# Patient Record
Sex: FEMALE | Race: WHITE | NOT HISPANIC OR LATINO | URBAN - METROPOLITAN AREA
[De-identification: names, ages, dates, MRNs, and addresses within clinical notes are randomized per-mention and may not be internally consistent; named-entity substitution may affect disease eponyms.]

---

## 2020-06-19 ENCOUNTER — TELEPHONE ENCOUNTER (OUTPATIENT)
Dept: URBAN - METROPOLITAN AREA CLINIC 54 | Facility: CLINIC | Age: 50
End: 2020-06-19

## 2020-06-19 RX ORDER — BUDESONIDE 3 MG/1
TAKE 3 CAPSULES (9 MG) BY ORAL ROUTE ONCE DAILY IN THE MORNING FOR UP TO 8 WEEKS CAPSULE ORAL 1
Qty: 90 | Refills: 1
Start: 2020-03-27

## 2020-07-08 ENCOUNTER — TELEPHONE ENCOUNTER (OUTPATIENT)
Dept: URBAN - METROPOLITAN AREA CLINIC 54 | Facility: CLINIC | Age: 50
End: 2020-07-08

## 2020-07-08 RX ORDER — BUDESONIDE 3 MG/1
TAKE 3 CAPSULES (9 MG) BY ORAL ROUTE ONCE DAILY IN THE MORNING FOR UP TO 8 WEEKS CAPSULE ORAL 1
Qty: 90 | Refills: 1
Start: 2020-03-27

## 2020-07-17 ENCOUNTER — TELEPHONE ENCOUNTER (OUTPATIENT)
Dept: URBAN - METROPOLITAN AREA CLINIC 54 | Facility: CLINIC | Age: 50
End: 2020-07-17

## 2020-07-21 ENCOUNTER — TELEPHONE ENCOUNTER (OUTPATIENT)
Dept: URBAN - METROPOLITAN AREA CLINIC 54 | Facility: CLINIC | Age: 50
End: 2020-07-21

## 2020-07-28 ENCOUNTER — TELEPHONE ENCOUNTER (OUTPATIENT)
Dept: URBAN - METROPOLITAN AREA CLINIC 54 | Facility: CLINIC | Age: 50
End: 2020-07-28

## 2020-07-28 RX ORDER — ADALIMUMAB 40MG/0.4ML
AS DIRECTED KIT SUBCUTANEOUS
Qty: 6 | Refills: 5 | OUTPATIENT

## 2020-07-28 RX ORDER — ADALIMUMAB 40MG/0.8ML
INJECT 0.8 MILLILITER (40 MG) BY SUBCUTANEOUS ROUTE EVERY 2 WEEKS IN THE ABDOMEN OR THIGH (ROTATE SITES) KIT SUBCUTANEOUS
Qty: 1 | Refills: 0
Start: 1900-01-01

## 2020-08-04 ENCOUNTER — TELEPHONE ENCOUNTER (OUTPATIENT)
Dept: URBAN - METROPOLITAN AREA CLINIC 54 | Facility: CLINIC | Age: 50
End: 2020-08-04

## 2020-08-25 ENCOUNTER — TELEPHONE ENCOUNTER (OUTPATIENT)
Dept: URBAN - METROPOLITAN AREA CLINIC 54 | Facility: CLINIC | Age: 50
End: 2020-08-25

## 2020-08-25 RX ORDER — DIPHENOXYLATE HCL/ATROPINE 2.5-.025MG
TAKE 1 TABLET BY ORAL ROUTE 4 TIMES A DAY AS NEEDED TABLET ORAL
Start: 2020-03-27

## 2020-08-31 ENCOUNTER — TELEPHONE ENCOUNTER (OUTPATIENT)
Dept: URBAN - METROPOLITAN AREA CLINIC 54 | Facility: CLINIC | Age: 50
End: 2020-08-31

## 2020-10-08 ENCOUNTER — LAB OUTSIDE AN ENCOUNTER (OUTPATIENT)
Dept: URBAN - METROPOLITAN AREA CLINIC 54 | Facility: CLINIC | Age: 50
End: 2020-10-08

## 2020-10-08 ENCOUNTER — TELEPHONE ENCOUNTER (OUTPATIENT)
Dept: URBAN - METROPOLITAN AREA CLINIC 54 | Facility: CLINIC | Age: 50
End: 2020-10-08

## 2020-10-08 RX ORDER — MESALAMINE 0.38 G/1
4 CAPSULES IN THE MORNING CAPSULE, EXTENDED RELEASE ORAL ONCE A DAY
Qty: 120 CAPSULE | OUTPATIENT
Start: 2020-10-08 | End: 2020-11-06

## 2020-10-08 RX ORDER — ADALIMUMAB 40MG/0.8ML
INJECT 0.8 MILLILITER (40 MG) BY SUBCUTANEOUS ROUTE EVERY 2 WEEKS IN THE ABDOMEN OR THIGH (ROTATE SITES) KIT SUBCUTANEOUS
Qty: 1 | Refills: 0 | Status: ACTIVE | COMMUNITY
Start: 1900-01-01

## 2020-10-08 RX ORDER — DIPHENOXYLATE HCL/ATROPINE 2.5-.025MG
TAKE 1 TABLET BY ORAL ROUTE 4 TIMES A DAY AS NEEDED TABLET ORAL
Status: ACTIVE | COMMUNITY
Start: 2020-03-27

## 2020-10-08 RX ORDER — ADALIMUMAB 40MG/0.4ML
AS DIRECTED KIT SUBCUTANEOUS
Qty: 6 | Refills: 5 | Status: ACTIVE | COMMUNITY

## 2020-10-08 RX ORDER — TRAMADOL HYDROCHLORIDE 100 MG/1
TAKE 1 TABLET (100 MG) BY ORAL ROUTE ONCE DAILY TABLET, EXTENDED RELEASE ORAL 1
Qty: 0 | Refills: 0 | Status: ACTIVE | COMMUNITY
Start: 1900-01-01 | End: 1900-01-01

## 2020-10-08 RX ORDER — BUDESONIDE 3 MG/1
TAKE 3 CAPSULES (9 MG) BY ORAL ROUTE ONCE DAILY IN THE MORNING FOR UP TO 8 WEEKS CAPSULE ORAL 1
Qty: 90 | Refills: 1 | Status: ACTIVE | COMMUNITY
Start: 2020-03-27

## 2020-10-08 RX ORDER — PHENAZOPYRIDINE HYDROCHLORIDE 100 MG/1
TAKE 1 CAPSULE (60 MG) BY ORAL ROUTE ONCE DAILY TABLET, COATED ORAL 1
Qty: 0 | Refills: 0 | Status: ACTIVE | COMMUNITY
Start: 1900-01-01 | End: 1900-01-01

## 2020-10-09 LAB — C DIFFICILE TOXINS A+B, EIA: NEGATIVE

## 2020-10-20 ENCOUNTER — WEB ENCOUNTER (OUTPATIENT)
Dept: URBAN - METROPOLITAN AREA CLINIC 54 | Facility: CLINIC | Age: 50
End: 2020-10-20

## 2020-10-20 RX ORDER — MESALAMINE 0.38 G/1
4 CAPSULES IN THE MORNING CAPSULE, EXTENDED RELEASE ORAL ONCE A DAY
Qty: 120 CAPSULE | Status: ACTIVE | COMMUNITY
Start: 2020-10-08 | End: 2020-11-06

## 2020-10-20 RX ORDER — DIPHENOXYLATE HCL/ATROPINE 2.5-.025MG
TAKE 1 TABLET BY ORAL ROUTE 4 TIMES A DAY AS NEEDED TABLET ORAL
Status: ACTIVE | COMMUNITY
Start: 2020-03-27

## 2020-10-20 RX ORDER — ADALIMUMAB 40MG/0.4ML
AS DIRECTED KIT SUBCUTANEOUS
Qty: 6 | Refills: 5 | Status: ACTIVE | COMMUNITY

## 2020-10-20 RX ORDER — TRAMADOL HYDROCHLORIDE 100 MG/1
TAKE 1 TABLET (100 MG) BY ORAL ROUTE ONCE DAILY TABLET, EXTENDED RELEASE ORAL 1
Qty: 0 | Refills: 0 | Status: ACTIVE | COMMUNITY
Start: 1900-01-01 | End: 1900-01-01

## 2020-10-20 RX ORDER — BUDESONIDE 3 MG/1
TAKE 3 CAPSULES (9 MG) BY ORAL ROUTE ONCE DAILY IN THE MORNING FOR UP TO 8 WEEKS CAPSULE ORAL 1
Qty: 90 | Refills: 1 | Status: ACTIVE | COMMUNITY
Start: 2020-03-27

## 2020-10-20 RX ORDER — PHENAZOPYRIDINE HYDROCHLORIDE 100 MG/1
TAKE 1 CAPSULE (60 MG) BY ORAL ROUTE ONCE DAILY TABLET, COATED ORAL 1
Qty: 0 | Refills: 0 | Status: ACTIVE | COMMUNITY
Start: 1900-01-01 | End: 1900-01-01

## 2020-10-20 RX ORDER — ADALIMUMAB 40MG/0.8ML
INJECT 0.8 MILLILITER (40 MG) BY SUBCUTANEOUS ROUTE EVERY 2 WEEKS IN THE ABDOMEN OR THIGH (ROTATE SITES) KIT SUBCUTANEOUS
Qty: 1 | Refills: 0 | Status: ACTIVE | COMMUNITY
Start: 1900-01-01

## 2020-10-20 RX ORDER — PREDNISONE 10 MG/1
4 TABLETS QAMX 7DAYS, 3 TABS QAM X 7 DAYS, 2 TABS Q AM X 7 DAYS 1 TAB QAM X 7 DAYS TABLET ORAL ONCE A DAY
Qty: 72 | OUTPATIENT
Start: 2020-10-21 | End: 2020-11-17

## 2020-10-28 ENCOUNTER — TELEPHONE ENCOUNTER (OUTPATIENT)
Dept: URBAN - METROPOLITAN AREA CLINIC 54 | Facility: CLINIC | Age: 50
End: 2020-10-28

## 2020-11-12 ENCOUNTER — WEB ENCOUNTER (OUTPATIENT)
Dept: URBAN - METROPOLITAN AREA CLINIC 54 | Facility: CLINIC | Age: 50
End: 2020-11-12

## 2020-11-12 ENCOUNTER — OFFICE VISIT (OUTPATIENT)
Dept: URBAN - METROPOLITAN AREA CLINIC 54 | Facility: CLINIC | Age: 50
End: 2020-11-12
Payer: SELF-PAY

## 2020-11-12 DIAGNOSIS — K52.831 COLLAGENOUS COLITIS: ICD-10-CM

## 2020-11-12 DIAGNOSIS — R19.7 DIARRHEA: ICD-10-CM

## 2020-11-12 PROCEDURE — G8482 FLU IMMUNIZE ORDER/ADMIN: HCPCS | Performed by: INTERNAL MEDICINE

## 2020-11-12 PROCEDURE — 99213 OFFICE O/P EST LOW 20 MIN: CPT | Performed by: INTERNAL MEDICINE

## 2020-11-12 PROCEDURE — G9902 PT SCRN TBCO AND ID AS USER: HCPCS | Performed by: INTERNAL MEDICINE

## 2020-11-12 PROCEDURE — G8427 DOCREV CUR MEDS BY ELIG CLIN: HCPCS | Performed by: INTERNAL MEDICINE

## 2020-11-12 PROCEDURE — 3017F COLORECTAL CA SCREEN DOC REV: CPT | Performed by: INTERNAL MEDICINE

## 2020-11-12 PROCEDURE — G8420 CALC BMI NORM PARAMETERS: HCPCS | Performed by: INTERNAL MEDICINE

## 2020-11-12 RX ORDER — BUDESONIDE 3 MG/1
TAKE 3 CAPSULES (9 MG) BY ORAL ROUTE ONCE DAILY IN THE MORNING FOR UP TO 8 WEEKS CAPSULE ORAL 1
Qty: 90 | Refills: 1 | Status: ON HOLD | COMMUNITY
Start: 2020-03-27

## 2020-11-12 RX ORDER — ADALIMUMAB 40MG/0.4ML
AS DIRECTED KIT SUBCUTANEOUS
Qty: 6 | Refills: 5 | Status: ACTIVE | COMMUNITY

## 2020-11-12 RX ORDER — TRAMADOL HYDROCHLORIDE 100 MG/1
TAKE 1 TABLET (100 MG) BY ORAL ROUTE ONCE DAILY TABLET, EXTENDED RELEASE ORAL 1
Qty: 0 | Refills: 0 | Status: ACTIVE | COMMUNITY
Start: 1900-01-01

## 2020-11-12 RX ORDER — PHENAZOPYRIDINE HYDROCHLORIDE 100 MG/1
TAKE 1 CAPSULE (60 MG) BY ORAL ROUTE ONCE DAILY TABLET, COATED ORAL 1
Qty: 0 | Refills: 0 | Status: ACTIVE | COMMUNITY
Start: 1900-01-01

## 2020-11-12 RX ORDER — DIPHENOXYLATE HCL/ATROPINE 2.5-.025MG
TAKE 1 TABLET BY ORAL ROUTE 4 TIMES A DAY AS NEEDED TABLET ORAL
Status: ACTIVE | COMMUNITY
Start: 2020-03-27

## 2020-11-12 RX ORDER — PREDNISONE 10 MG/1
4 TABLETS QAMX 7DAYS, 3 TABS QAM X 7 DAYS, 2 TABS Q AM X 7 DAYS 1 TAB QAM X 7 DAYS TABLET ORAL ONCE A DAY
Qty: 72 | Status: ACTIVE | COMMUNITY
Start: 2020-10-21 | End: 2020-11-17

## 2020-11-12 RX ORDER — ADALIMUMAB 40MG/0.8ML
INJECT 0.8 MILLILITER (40 MG) BY SUBCUTANEOUS ROUTE EVERY 2 WEEKS IN THE ABDOMEN OR THIGH (ROTATE SITES) KIT SUBCUTANEOUS
Qty: 1 | Refills: 0 | Status: ACTIVE | COMMUNITY
Start: 1900-01-01

## 2020-11-12 RX ORDER — MESALAMINE 1.2 G/1
2 TABLETS TABLET, DELAYED RELEASE ORAL ONCE A DAY
Qty: 180 TABLET | Refills: 3 | OUTPATIENT
Start: 2020-11-12 | End: 2021-11-07

## 2020-11-12 NOTE — HPI-OTHER HISTORIES
>>prior visit  This patient presents on self-referral as a 50 year old female, who is requesting an evaluation for colitis. A copy of this document will be sent to the referring provider. Currently, the patient complains of diarrhea for the last 5 years. She describes 4-5 stools per day for the last 2 weeks. The stools are loose and non-bloody. Nocturnal stools are not present. She can tolerate solids and liquids. She has not recently traveled. She has not recently been hospitalized. She has not recently started any new medications. Symptoms have been present for 5 years. Severity has been moderate. There are no other complaints.  TELEHEALTH VISIT >>3/27/2020 Previously a patient of Dr Franks with GAG. Treatment recently with prednisone taper. Uceris effective in past but expensive. Recent ER visit, negative CT. Collagenous colitis diagnosed 2015. Mesalamine ineffective and caused side effects. No bleeding, minimal abdominal pain.   >>5/13/2020 Improved on budesonide, no diarrhea. Some bloating and occasionally a day with no stool. Not taking Lomotil at present. No GI bleeding or other symptoms. No other health issues. Humira for RA.

## 2020-11-12 NOTE — HPI-TODAY'S VISIT:
50-year-old female seen in follow-up for chronic diarrhea with collagenous colitis.  She had been on Entocort 9 mg/day but this caused expressive excessive bloating and weight gain.  She asks about trying a mesalamine prep.  There have been some insurance obstacles to obtaining Apriso although Lialda may be available.  She had a significant flareup last month and was placed on a steroid taper which she is nearly finishing.  She is already been on Humira for treatment of her rheumatoid arthritis.  While on the steroid taper she has had resolution of diarrhea.  During a flareup she has many as 10 stools watery in consistency per day there is been no bleeding or loss of weight.  Her last colonoscopy was in 2015.  Her health otherwise has been good.

## 2020-12-30 ENCOUNTER — TELEPHONE ENCOUNTER (OUTPATIENT)
Dept: URBAN - METROPOLITAN AREA CLINIC 54 | Facility: CLINIC | Age: 50
End: 2020-12-30

## 2020-12-30 RX ORDER — PREDNISONE 10 MG/1
4 TABLETS QAMX 7DAYS, 3 TABS QAM X 7 DAYS, 2 TABS Q AM X 7 DAYS 1 TAB QAM X 7 DAYS TABLET ORAL ONCE A DAY
Qty: 72 | OUTPATIENT
Start: 2021-01-04 | End: 2021-02-01

## 2020-12-30 RX ORDER — BUDESONIDE 3 MG/1
TAKE 3 CAPSULES (9 MG) BY ORAL ROUTE ONCE DAILY IN THE MORNING FOR UP TO 8 WEEKS CAPSULE ORAL ONCE A DAY
Qty: 90 | Refills: 1

## 2021-01-06 ENCOUNTER — OFFICE VISIT (OUTPATIENT)
Dept: URBAN - METROPOLITAN AREA CLINIC 54 | Facility: CLINIC | Age: 51
End: 2021-01-06

## 2021-01-06 RX ORDER — ADALIMUMAB 40MG/0.4ML
AS DIRECTED KIT SUBCUTANEOUS
Qty: 6 | Refills: 5 | Status: ACTIVE | COMMUNITY

## 2021-01-06 RX ORDER — BUDESONIDE 3 MG/1
TAKE 3 CAPSULES (9 MG) BY ORAL ROUTE ONCE DAILY IN THE MORNING FOR UP TO 8 WEEKS CAPSULE ORAL 1
Qty: 90 | Refills: 1 | Status: DISCONTINUED | COMMUNITY
Start: 2020-03-27

## 2021-01-06 RX ORDER — MESALAMINE 1.2 G/1
2 TABLETS TABLET, DELAYED RELEASE ORAL ONCE A DAY
Qty: 180 TABLET | Refills: 3 | Status: ACTIVE | COMMUNITY
Start: 2020-11-12 | End: 2021-11-07

## 2021-01-06 RX ORDER — TRAMADOL HYDROCHLORIDE 100 MG/1
TAKE 1 TABLET (100 MG) BY ORAL ROUTE ONCE DAILY TABLET, EXTENDED RELEASE ORAL 1
Qty: 0 | Refills: 0 | Status: ACTIVE | COMMUNITY
Start: 1900-01-01

## 2021-01-06 RX ORDER — ADALIMUMAB 40MG/0.8ML
INJECT 0.8 MILLILITER (40 MG) BY SUBCUTANEOUS ROUTE EVERY 2 WEEKS IN THE ABDOMEN OR THIGH (ROTATE SITES) KIT SUBCUTANEOUS
Qty: 1 | Refills: 0 | Status: ACTIVE | COMMUNITY
Start: 1900-01-01

## 2021-01-06 RX ORDER — PHENAZOPYRIDINE HYDROCHLORIDE 100 MG/1
TAKE 1 CAPSULE (60 MG) BY ORAL ROUTE ONCE DAILY TABLET, COATED ORAL 1
Qty: 0 | Refills: 0 | Status: ACTIVE | COMMUNITY
Start: 1900-01-01

## 2021-01-06 RX ORDER — DIPHENOXYLATE HCL/ATROPINE 2.5-.025MG
TAKE 1 TABLET BY ORAL ROUTE 4 TIMES A DAY AS NEEDED TABLET ORAL
Status: ACTIVE | COMMUNITY
Start: 2020-03-27

## 2021-02-08 ENCOUNTER — TELEPHONE ENCOUNTER (OUTPATIENT)
Dept: URBAN - METROPOLITAN AREA CLINIC 54 | Facility: CLINIC | Age: 51
End: 2021-02-08

## 2021-02-08 RX ORDER — MESALAMINE 1.2 G/1
2 TABLETS TABLET, DELAYED RELEASE ORAL ONCE A DAY
Qty: 180 TABLET | Refills: 3 | Status: ACTIVE | COMMUNITY
Start: 2020-11-12 | End: 2021-11-07

## 2021-02-08 RX ORDER — BUDESONIDE 3 MG/1
2 CAPSULES CAPSULE ORAL ONCE A DAY
Qty: 60 CAPSULE | OUTPATIENT

## 2021-02-08 RX ORDER — TRAMADOL HYDROCHLORIDE 100 MG/1
TAKE 1 TABLET (100 MG) BY ORAL ROUTE ONCE DAILY TABLET, EXTENDED RELEASE ORAL 1
Qty: 0 | Refills: 0 | Status: ACTIVE | COMMUNITY
Start: 1900-01-01

## 2021-02-08 RX ORDER — PHENAZOPYRIDINE HYDROCHLORIDE 100 MG/1
TAKE 1 CAPSULE (60 MG) BY ORAL ROUTE ONCE DAILY TABLET, COATED ORAL 1
Qty: 0 | Refills: 0 | Status: ACTIVE | COMMUNITY
Start: 1900-01-01

## 2021-02-08 RX ORDER — ADALIMUMAB 40MG/0.8ML
INJECT 0.8 MILLILITER (40 MG) BY SUBCUTANEOUS ROUTE EVERY 2 WEEKS IN THE ABDOMEN OR THIGH (ROTATE SITES) KIT SUBCUTANEOUS
Qty: 1 | Refills: 0 | Status: ACTIVE | COMMUNITY
Start: 1900-01-01

## 2021-02-08 RX ORDER — DIPHENOXYLATE HCL/ATROPINE 2.5-.025MG
TAKE 1 TABLET BY ORAL ROUTE 4 TIMES A DAY AS NEEDED TABLET ORAL
Status: ACTIVE | COMMUNITY
Start: 2020-03-27

## 2021-02-08 RX ORDER — ADALIMUMAB 40MG/0.4ML
AS DIRECTED KIT SUBCUTANEOUS
Qty: 6 | Refills: 5 | Status: ACTIVE | COMMUNITY

## 2021-02-09 ENCOUNTER — TELEPHONE ENCOUNTER (OUTPATIENT)
Dept: URBAN - METROPOLITAN AREA CLINIC 54 | Facility: CLINIC | Age: 51
End: 2021-02-09

## 2021-02-10 ENCOUNTER — OFFICE VISIT (OUTPATIENT)
Dept: URBAN - METROPOLITAN AREA CLINIC 54 | Facility: CLINIC | Age: 51
End: 2021-02-10

## 2021-02-10 RX ORDER — PREDNISONE 10 MG/1
4 TABLETS QAMX 7DAYS, 3 TABS QAM X 7 DAYS, 2 TABS Q AM X 7 DAYS 1 TAB QAM X 7 DAYS TABLET ORAL ONCE A DAY
Qty: 72 | Status: ACTIVE | COMMUNITY
Start: 2021-01-04 | End: 2021-02-01

## 2021-02-10 RX ORDER — ADALIMUMAB 40MG/0.8ML
INJECT 0.8 MILLILITER (40 MG) BY SUBCUTANEOUS ROUTE EVERY 2 WEEKS IN THE ABDOMEN OR THIGH (ROTATE SITES) KIT SUBCUTANEOUS
Qty: 1 | Refills: 0 | COMMUNITY
Start: 1900-01-01

## 2021-02-10 RX ORDER — TRAMADOL HYDROCHLORIDE 100 MG/1
TAKE 1 TABLET (100 MG) BY ORAL ROUTE ONCE DAILY TABLET, EXTENDED RELEASE ORAL 1
Qty: 0 | Refills: 0 | COMMUNITY
Start: 1900-01-01

## 2021-02-10 RX ORDER — MESALAMINE 1.2 G/1
2 TABLETS TABLET, DELAYED RELEASE ORAL ONCE A DAY
Qty: 180 TABLET | Refills: 3 | COMMUNITY
Start: 2020-11-12 | End: 2021-11-07

## 2021-02-10 RX ORDER — ADALIMUMAB 40MG/0.4ML
AS DIRECTED KIT SUBCUTANEOUS
Qty: 6 | Refills: 5 | COMMUNITY

## 2021-02-10 RX ORDER — DIPHENOXYLATE HCL/ATROPINE 2.5-.025MG
TAKE 1 TABLET BY ORAL ROUTE 4 TIMES A DAY AS NEEDED TABLET ORAL
COMMUNITY
Start: 2020-03-27

## 2021-02-10 RX ORDER — PHENAZOPYRIDINE HYDROCHLORIDE 100 MG/1
TAKE 1 CAPSULE (60 MG) BY ORAL ROUTE ONCE DAILY TABLET, COATED ORAL 1
Qty: 0 | Refills: 0 | COMMUNITY
Start: 1900-01-01

## 2021-02-10 RX ORDER — BUDESONIDE 3 MG/1
TAKE 3 CAPSULES (9 MG) BY ORAL ROUTE ONCE DAILY IN THE MORNING FOR UP TO 8 WEEKS CAPSULE ORAL ONCE A DAY
Qty: 90 | Refills: 1 | Status: ACTIVE | COMMUNITY

## 2021-03-17 ENCOUNTER — OFFICE VISIT (OUTPATIENT)
Dept: URBAN - METROPOLITAN AREA CLINIC 54 | Facility: CLINIC | Age: 51
End: 2021-03-17
Payer: SELF-PAY

## 2021-03-17 DIAGNOSIS — R19.7 DIARRHEA: ICD-10-CM

## 2021-03-17 DIAGNOSIS — K52.831 COLLAGENOUS COLITIS: ICD-10-CM

## 2021-03-17 PROCEDURE — 99213 OFFICE O/P EST LOW 20 MIN: CPT | Performed by: INTERNAL MEDICINE

## 2021-03-17 RX ORDER — BUDESONIDE 3 MG/1
TAKE 3 CAPSULES (9 MG) BY ORAL ROUTE ONCE DAILY IN THE MORNING FOR UP TO 8 WEEKS CAPSULE ORAL ONCE A DAY
Qty: 90 | Refills: 1 | Status: ACTIVE | COMMUNITY

## 2021-03-17 RX ORDER — PHENAZOPYRIDINE HYDROCHLORIDE 100 MG/1
TAKE 1 CAPSULE (60 MG) BY ORAL ROUTE ONCE DAILY TABLET, COATED ORAL 1
Qty: 0 | Refills: 0 | Status: ACTIVE | COMMUNITY
Start: 1900-01-01

## 2021-03-17 RX ORDER — ADALIMUMAB 40MG/0.8ML
INJECT 0.8 MILLILITER (40 MG) BY SUBCUTANEOUS ROUTE EVERY 2 WEEKS IN THE ABDOMEN OR THIGH (ROTATE SITES) KIT SUBCUTANEOUS
Qty: 1 | Refills: 0 | Status: ACTIVE | COMMUNITY
Start: 1900-01-01

## 2021-03-17 RX ORDER — DIPHENOXYLATE HCL/ATROPINE 2.5-.025MG
TAKE 1 TABLET BY ORAL ROUTE 4 TIMES A DAY AS NEEDED TABLET ORAL
Status: ACTIVE | COMMUNITY
Start: 2020-03-27

## 2021-03-17 RX ORDER — BUDESONIDE 3 MG/1
3 CAPSULES CAPSULE ORAL ONCE A DAY
Qty: 90 CAPSULE | Refills: 1
End: 2021-05-16

## 2021-03-17 RX ORDER — BUDESONIDE 3 MG/1
2 CAPSULES CAPSULE ORAL ONCE A DAY
Status: ACTIVE | COMMUNITY

## 2021-03-17 RX ORDER — TRAMADOL HYDROCHLORIDE 100 MG/1
TAKE 1 TABLET (100 MG) BY ORAL ROUTE ONCE DAILY TABLET, EXTENDED RELEASE ORAL 1
Qty: 0 | Refills: 0 | Status: ACTIVE | COMMUNITY
Start: 1900-01-01

## 2021-03-17 RX ORDER — ADALIMUMAB 40MG/0.4ML
AS DIRECTED KIT SUBCUTANEOUS
Qty: 6 | Refills: 5 | Status: ACTIVE | COMMUNITY

## 2021-03-17 RX ORDER — BUDESONIDE 3 MG/1
2 CAPSULES CAPSULE ORAL ONCE A DAY
Qty: 60 CAPSULE | Status: DISCONTINUED | COMMUNITY

## 2021-03-17 RX ORDER — MESALAMINE 1.2 G/1
2 TABLETS TABLET, DELAYED RELEASE ORAL ONCE A DAY
Qty: 180 TABLET | Refills: 3 | Status: DISCONTINUED | COMMUNITY
Start: 2020-11-12 | End: 2021-11-07

## 2021-03-17 NOTE — HPI-TODAY'S VISIT:
51-year-old female with collagenous colitis, last colonoscopy 2015, no prior history of polyps or family history of cancer.  She is back on budesonide for the past 2 months and is doing very well with no diarrhea or pain.  She is having some constipation which is improved with the use of fiber product daily.  She has no other ongoing health issues other than rheumatoid arthritis currently on Humira 40 mg subcu every week.

## 2021-07-22 ENCOUNTER — TELEPHONE ENCOUNTER (OUTPATIENT)
Dept: URBAN - METROPOLITAN AREA CLINIC 54 | Facility: CLINIC | Age: 51
End: 2021-07-22

## 2021-07-22 RX ORDER — BUDESONIDE 3 MG/1
TAKE 3 CAPSULES (9 MG) BY ORAL ROUTE ONCE DAILY IN THE MORNING FOR UP TO 8 WEEKS CAPSULE ORAL ONCE A DAY
Qty: 90 | Refills: 1
End: 2021-09-24

## 2021-07-27 ENCOUNTER — TELEPHONE ENCOUNTER (OUTPATIENT)
Dept: URBAN - METROPOLITAN AREA CLINIC 54 | Facility: CLINIC | Age: 51
End: 2021-07-27

## 2021-07-27 RX ORDER — BUDESONIDE 3 MG/1
TAKE 3 CAPSULES (9 MG) BY ORAL ROUTE ONCE DAILY IN THE MORNING FOR UP TO 8 WEEKS CAPSULE ORAL ONCE A DAY
Qty: 90 | Refills: 1
End: 2021-09-25

## 2021-07-29 ENCOUNTER — ERX REFILL RESPONSE (OUTPATIENT)
Dept: URBAN - METROPOLITAN AREA CLINIC 54 | Facility: CLINIC | Age: 51
End: 2021-07-29

## 2021-07-29 RX ORDER — BUDESONIDE 3 MG/1
TAKE THREE CAPSULES BY MOUTH DAILY FOR 30 DAYS CAPSULE, COATED PELLETS ORAL
Qty: 90 CAPSULE | Refills: 1 | OUTPATIENT

## 2021-08-23 ENCOUNTER — TELEPHONE ENCOUNTER (OUTPATIENT)
Dept: URBAN - METROPOLITAN AREA CLINIC 54 | Facility: CLINIC | Age: 51
End: 2021-08-23

## 2021-08-27 ENCOUNTER — TELEPHONE ENCOUNTER (OUTPATIENT)
Dept: URBAN - METROPOLITAN AREA CLINIC 23 | Facility: CLINIC | Age: 51
End: 2021-08-27

## 2021-08-27 RX ORDER — BUDESONIDE 3 MG/1
TAKE THREE CAPSULES BY MOUTH DAILY FOR 30 DAYS CAPSULE, COATED PELLETS ORAL
Qty: 90 CAPSULE | Refills: 1

## 2021-09-02 ENCOUNTER — TELEPHONE ENCOUNTER (OUTPATIENT)
Dept: URBAN - METROPOLITAN AREA CLINIC 54 | Facility: CLINIC | Age: 51
End: 2021-09-02

## 2021-09-06 ENCOUNTER — WEB ENCOUNTER (OUTPATIENT)
Dept: URBAN - METROPOLITAN AREA CLINIC 54 | Facility: CLINIC | Age: 51
End: 2021-09-06

## 2021-09-16 ENCOUNTER — OFFICE VISIT (OUTPATIENT)
Dept: URBAN - METROPOLITAN AREA CLINIC 54 | Facility: CLINIC | Age: 51
End: 2021-09-16
Payer: SELF-PAY

## 2021-09-16 VITALS
BODY MASS INDEX: 24.93 KG/M2 | WEIGHT: 149.6 LBS | DIASTOLIC BLOOD PRESSURE: 94 MMHG | TEMPERATURE: 97.1 F | HEIGHT: 65 IN | HEART RATE: 86 BPM | SYSTOLIC BLOOD PRESSURE: 140 MMHG

## 2021-09-16 DIAGNOSIS — K52.831 COLLAGENOUS COLITIS: ICD-10-CM

## 2021-09-16 DIAGNOSIS — R19.7 DIARRHEA: ICD-10-CM

## 2021-09-16 PROCEDURE — 99213 OFFICE O/P EST LOW 20 MIN: CPT | Performed by: INTERNAL MEDICINE

## 2021-09-16 RX ORDER — ADALIMUMAB 40MG/0.8ML
INJECT 0.8 MILLILITER (40 MG) BY SUBCUTANEOUS ROUTE EVERY 2 WEEKS IN THE ABDOMEN OR THIGH (ROTATE SITES) KIT SUBCUTANEOUS
Qty: 1 | Refills: 0 | Status: ON HOLD | COMMUNITY
Start: 1900-01-01

## 2021-09-16 RX ORDER — DIPHENOXYLATE HCL/ATROPINE 2.5-.025MG
TAKE 1 TABLET BY ORAL ROUTE 4 TIMES A DAY AS NEEDED TABLET ORAL
Status: ACTIVE | COMMUNITY
Start: 2020-03-27

## 2021-09-16 RX ORDER — ADALIMUMAB 40MG/0.4ML
AS DIRECTED KIT SUBCUTANEOUS
Qty: 6 | Refills: 5 | Status: ON HOLD | COMMUNITY

## 2021-09-16 RX ORDER — TRAMADOL HYDROCHLORIDE 100 MG/1
TAKE 1 TABLET (100 MG) BY ORAL ROUTE ONCE DAILY TABLET, EXTENDED RELEASE ORAL 1
Qty: 0 | Refills: 0 | Status: ACTIVE | COMMUNITY
Start: 1900-01-01

## 2021-09-16 RX ORDER — BUDESONIDE 3 MG/1
TAKE THREE CAPSULES BY MOUTH DAILY FOR 30 DAYS CAPSULE, COATED PELLETS ORAL
Qty: 90 CAPSULE | Refills: 1 | Status: ON HOLD | COMMUNITY

## 2021-09-16 RX ORDER — PHENAZOPYRIDINE HYDROCHLORIDE 100 MG/1
TAKE 1 CAPSULE (60 MG) BY ORAL ROUTE ONCE DAILY TABLET, COATED ORAL 1
Qty: 0 | Refills: 0 | Status: ACTIVE | COMMUNITY
Start: 1900-01-01

## 2021-09-16 RX ORDER — BUDESONIDE 3 MG/1
TAKE THREE CAPSULES BY MOUTH DAILY FOR 30 DAYS CAPSULE, COATED PELLETS ORAL
Qty: 90 CAPSULE | Refills: 1 | Status: ACTIVE | COMMUNITY

## 2021-09-16 RX ORDER — BUDESONIDE 3 MG/1
TAKE 3 CAPSULES (9 MG) BY ORAL ROUTE ONCE DAILY IN THE MORNING FOR UP TO 8 WEEKS CAPSULE ORAL ONCE A DAY
Qty: 90 | Refills: 1 | Status: ACTIVE | COMMUNITY
End: 2021-09-25

## 2021-09-16 RX ORDER — INFLIXIMAB 100 MG/10ML
AS DIRECTED INJECTION, POWDER, LYOPHILIZED, FOR SOLUTION INTRAVENOUS
Status: ACTIVE | COMMUNITY

## 2021-09-16 NOTE — HPI-TODAY'S VISIT:
51-year-old female with microscopic colitis on intermittent budesonide.  Fairly stable with occasional flareups.  She also uses Imodium as needed 2 to 4/day.  She has rare nocturnal stools and no bleeding.  There is no abdominal pain.  She is on Humira for rheumatoid arthritis being changed to Remicade for possible better response.  She had a fall and recently fractured her left ankle.  She now wears a boot.  No active cardiac or respiratory problems.  No personal history of: Polyps and no family history of colon cancer.  Her last colonoscopy was in 2015.

## 2021-09-16 NOTE — PHYSICAL EXAM CONSTITUTIONAL:
well developed, well nourished , in no acute distress , ambulating slowly with boot left foot, normal communication ability

## 2022-02-10 ENCOUNTER — TELEPHONE ENCOUNTER (OUTPATIENT)
Dept: URBAN - METROPOLITAN AREA CLINIC 92 | Facility: CLINIC | Age: 52
End: 2022-02-10

## 2022-03-16 ENCOUNTER — OFFICE VISIT (OUTPATIENT)
Dept: URBAN - METROPOLITAN AREA CLINIC 54 | Facility: CLINIC | Age: 52
End: 2022-03-16

## 2022-03-16 RX ORDER — INFLIXIMAB 100 MG/10ML
AS DIRECTED INJECTION, POWDER, LYOPHILIZED, FOR SOLUTION INTRAVENOUS
Status: ACTIVE | COMMUNITY

## 2022-03-16 RX ORDER — BUDESONIDE 3 MG/1
TAKE THREE CAPSULES BY MOUTH DAILY FOR 30 DAYS CAPSULE, COATED PELLETS ORAL
Qty: 90 CAPSULE | Refills: 1 | Status: ACTIVE | COMMUNITY

## 2022-03-16 RX ORDER — TRAMADOL HYDROCHLORIDE 100 MG/1
TAKE 1 TABLET (100 MG) BY ORAL ROUTE ONCE DAILY TABLET, EXTENDED RELEASE ORAL 1
Qty: 0 | Refills: 0 | Status: ACTIVE | COMMUNITY
Start: 1900-01-01

## 2022-03-16 RX ORDER — ADALIMUMAB 40MG/0.4ML
AS DIRECTED KIT SUBCUTANEOUS
Qty: 6 | Refills: 5 | Status: DISCONTINUED | COMMUNITY

## 2022-03-16 RX ORDER — ADALIMUMAB 40MG/0.8ML
INJECT 0.8 MILLILITER (40 MG) BY SUBCUTANEOUS ROUTE EVERY 2 WEEKS IN THE ABDOMEN OR THIGH (ROTATE SITES) KIT SUBCUTANEOUS
Qty: 1 | Refills: 0 | Status: DISCONTINUED | COMMUNITY
Start: 1900-01-01

## 2022-03-16 RX ORDER — PHENAZOPYRIDINE HYDROCHLORIDE 100 MG/1
TAKE 1 CAPSULE (60 MG) BY ORAL ROUTE ONCE DAILY TABLET, COATED ORAL 1
Qty: 0 | Refills: 0 | Status: ACTIVE | COMMUNITY
Start: 1900-01-01

## 2022-03-16 RX ORDER — DIPHENOXYLATE HCL/ATROPINE 2.5-.025MG
TAKE 1 TABLET BY ORAL ROUTE 4 TIMES A DAY AS NEEDED TABLET ORAL
Status: ACTIVE | COMMUNITY
Start: 2020-03-27

## 2022-03-30 ENCOUNTER — TELEPHONE ENCOUNTER (OUTPATIENT)
Dept: URBAN - METROPOLITAN AREA CLINIC 92 | Facility: CLINIC | Age: 52
End: 2022-03-30

## 2022-03-30 RX ORDER — BUDESONIDE 3 MG/1
TAKE 3 CAPSULES BY MOUTH ONCE DAILY CAPSULE, DELAYED RELEASE PELLETS ORAL
Qty: 90 CAPSULE | Refills: 1

## 2022-04-11 ENCOUNTER — ERX REFILL RESPONSE (OUTPATIENT)
Dept: URBAN - METROPOLITAN AREA CLINIC 54 | Facility: CLINIC | Age: 52
End: 2022-04-11

## 2022-04-11 RX ORDER — BUDESONIDE 3 MG/1
TAKE 3 CAPSULES BY MOUTH ONCE DAILY CAPSULE, DELAYED RELEASE PELLETS ORAL
Qty: 90 CAPSULE | Refills: 1 | OUTPATIENT

## 2022-08-05 ENCOUNTER — TELEPHONE ENCOUNTER (OUTPATIENT)
Dept: URBAN - METROPOLITAN AREA CLINIC 63 | Facility: CLINIC | Age: 52
End: 2022-08-05

## 2022-08-29 ENCOUNTER — WEB ENCOUNTER (OUTPATIENT)
Dept: URBAN - METROPOLITAN AREA CLINIC 54 | Facility: CLINIC | Age: 52
End: 2022-08-29

## 2022-08-29 RX ORDER — PHENAZOPYRIDINE HYDROCHLORIDE 100 MG/1
TAKE 1 CAPSULE (60 MG) BY ORAL ROUTE ONCE DAILY TABLET, COATED ORAL 1
Qty: 0 | Refills: 0 | Status: ACTIVE | COMMUNITY
Start: 1900-01-01

## 2022-08-29 RX ORDER — METRONIDAZOLE 500 MG/1
1 TABLET TABLET ORAL THREE TIMES A DAY
Qty: 42 TABLET | OUTPATIENT
Start: 2022-09-06 | End: 2022-09-20

## 2022-08-29 RX ORDER — BUDESONIDE 3 MG/1
TAKE 3 CAPSULES BY MOUTH ONCE DAILY CAPSULE, DELAYED RELEASE PELLETS ORAL
Qty: 90 CAPSULE | Refills: 1 | Status: ACTIVE | COMMUNITY

## 2022-08-29 RX ORDER — DIPHENOXYLATE HCL/ATROPINE 2.5-.025MG
TAKE 1 TABLET BY ORAL ROUTE 4 TIMES A DAY AS NEEDED TABLET ORAL
Status: ACTIVE | COMMUNITY
Start: 2020-03-27

## 2022-08-29 RX ORDER — TRAMADOL HYDROCHLORIDE 100 MG/1
TAKE 1 TABLET (100 MG) BY ORAL ROUTE ONCE DAILY TABLET, EXTENDED RELEASE ORAL 1
Qty: 0 | Refills: 0 | Status: ACTIVE | COMMUNITY
Start: 1900-01-01

## 2022-08-29 RX ORDER — INFLIXIMAB 100 MG/10ML
AS DIRECTED INJECTION, POWDER, LYOPHILIZED, FOR SOLUTION INTRAVENOUS
Status: ACTIVE | COMMUNITY

## 2022-09-06 ENCOUNTER — ERX REFILL RESPONSE (OUTPATIENT)
Dept: URBAN - METROPOLITAN AREA CLINIC 54 | Facility: CLINIC | Age: 52
End: 2022-09-06

## 2022-09-06 RX ORDER — BUDESONIDE 3 MG/1
TAKE 3 CAPSULES BY MOUTH ONCE DAILY CAPSULE, DELAYED RELEASE PELLETS ORAL
Qty: 90 CAPSULE | Refills: 1 | OUTPATIENT

## 2024-05-06 ENCOUNTER — DASHBOARD ENCOUNTERS (OUTPATIENT)
Age: 54
End: 2024-05-06

## 2024-05-06 ENCOUNTER — OFFICE VISIT (OUTPATIENT)
Dept: URBAN - METROPOLITAN AREA CLINIC 54 | Facility: CLINIC | Age: 54
End: 2024-05-06
Payer: COMMERCIAL

## 2024-05-06 ENCOUNTER — LAB OUTSIDE AN ENCOUNTER (OUTPATIENT)
Dept: URBAN - METROPOLITAN AREA CLINIC 54 | Facility: CLINIC | Age: 54
End: 2024-05-06

## 2024-05-06 VITALS
SYSTOLIC BLOOD PRESSURE: 121 MMHG | HEIGHT: 65 IN | HEART RATE: 73 BPM | DIASTOLIC BLOOD PRESSURE: 86 MMHG | TEMPERATURE: 97 F | WEIGHT: 144.6 LBS | BODY MASS INDEX: 24.09 KG/M2

## 2024-05-06 DIAGNOSIS — K52.831 COLLAGENOUS COLITIS: ICD-10-CM

## 2024-05-06 DIAGNOSIS — R10.9 RIGHT SIDED ABDOMINAL PAIN: ICD-10-CM

## 2024-05-06 DIAGNOSIS — R19.7 DIARRHEA: ICD-10-CM

## 2024-05-06 PROCEDURE — 99214 OFFICE O/P EST MOD 30 MIN: CPT | Performed by: PHYSICIAN ASSISTANT

## 2024-05-06 RX ORDER — TRAZODONE HYDROCHLORIDE 100 MG/1
1 TABLET AT BEDTIME TABLET ORAL ONCE A DAY
Status: ACTIVE | COMMUNITY

## 2024-05-06 RX ORDER — PHENAZOPYRIDINE HYDROCHLORIDE 100 MG/1
TAKE 1 CAPSULE (60 MG) BY ORAL ROUTE ONCE DAILY TABLET, COATED ORAL 1
Qty: 0 | Refills: 0 | Status: ACTIVE | COMMUNITY
Start: 1900-01-01

## 2024-05-07 ENCOUNTER — TELEPHONE ENCOUNTER (OUTPATIENT)
Dept: URBAN - METROPOLITAN AREA CLINIC 54 | Facility: CLINIC | Age: 54
End: 2024-05-07

## 2024-05-09 LAB
A/G RATIO: 2.2
ALBUMIN: 4.3
ALKALINE PHOSPHATASE: 139
ALT (SGPT): 29
AST (SGOT): 21
BASO (ABSOLUTE): 0.1
BASOS: 1
BILIRUBIN, TOTAL: 0.4
BUN/CREATININE RATIO: 14
BUN: 11
C-REACTIVE PROTEIN, QUANT: 3
CALCIUM: 9.4
CARBON DIOXIDE, TOTAL: 24
CHLORIDE: 102
CREATININE: 0.79
EGFR: 88
EOS (ABSOLUTE): 0.2
EOS: 3
GLOBULIN, TOTAL: 2
GLUCOSE: 78
HEMATOCRIT: 40.6
HEMATOLOGY COMMENTS:: (no result)
HEMOGLOBIN: 14.1
IMMATURE CELLS: (no result)
IMMATURE GRANS (ABS): 0
IMMATURE GRANULOCYTES: 0
LYMPHS (ABSOLUTE): 2.8
LYMPHS: 33
MCH: 34.6
MCHC: 34.7
MCV: 100
MONOCYTES(ABSOLUTE): 0.5
MONOCYTES: 6
NEUTROPHILS (ABSOLUTE): 4.7
NEUTROPHILS: 57
NRBC: (no result)
PLATELETS: 274
POTASSIUM: 4
PROTEIN, TOTAL: 6.3
RBC: 4.07
RDW: 12.7
SODIUM: 142
WBC: 8.2

## 2024-05-15 ENCOUNTER — TELEPHONE ENCOUNTER (OUTPATIENT)
Dept: URBAN - METROPOLITAN AREA CLINIC 54 | Facility: CLINIC | Age: 54
End: 2024-05-15

## 2024-05-15 ENCOUNTER — LAB OUTSIDE AN ENCOUNTER (OUTPATIENT)
Dept: URBAN - METROPOLITAN AREA CLINIC 54 | Facility: CLINIC | Age: 54
End: 2024-05-15

## 2024-11-25 ENCOUNTER — TELEPHONE ENCOUNTER (OUTPATIENT)
Dept: URBAN - METROPOLITAN AREA CLINIC 54 | Facility: CLINIC | Age: 54
End: 2024-11-25

## 2024-11-25 RX ORDER — BUDESONIDE 3 MG/1
TAKE 3 CAPSULES BY MOUTH ONCE DAILY CAPSULE, DELAYED RELEASE PELLETS ORAL ONCE A DAY
Qty: 180 | Refills: 1